# Patient Record
Sex: FEMALE | Race: WHITE | URBAN - METROPOLITAN AREA
[De-identification: names, ages, dates, MRNs, and addresses within clinical notes are randomized per-mention and may not be internally consistent; named-entity substitution may affect disease eponyms.]

---

## 2022-12-31 ENCOUNTER — ANESTHESIA (OUTPATIENT)
Dept: LABOR AND DELIVERY | Age: 32
End: 2022-12-31

## 2022-12-31 ENCOUNTER — ANESTHESIA EVENT (OUTPATIENT)
Dept: LABOR AND DELIVERY | Age: 32
End: 2022-12-31

## 2022-12-31 PROCEDURE — 6360000002 HC RX W HCPCS: Performed by: ANESTHESIOLOGY

## 2022-12-31 PROCEDURE — 3700000025 EPIDURAL BLOCK: Performed by: ANESTHESIOLOGY

## 2022-12-31 PROCEDURE — 6360000002 HC RX W HCPCS: Performed by: NURSE ANESTHETIST, CERTIFIED REGISTERED

## 2022-12-31 RX ORDER — ROPIVACAINE HYDROCHLORIDE 5 MG/ML
INJECTION, SOLUTION EPIDURAL; INFILTRATION; PERINEURAL PRN
Status: DISCONTINUED | OUTPATIENT
Start: 2022-12-31 | End: 2023-01-01 | Stop reason: SDUPTHER

## 2022-12-31 RX ORDER — ROPIVACAINE HYDROCHLORIDE 2 MG/ML
INJECTION, SOLUTION EPIDURAL; INFILTRATION; PERINEURAL CONTINUOUS PRN
Status: DISCONTINUED | OUTPATIENT
Start: 2022-12-31 | End: 2023-01-01 | Stop reason: SDUPTHER

## 2022-12-31 RX ADMIN — ROPIVACAINE HYDROCHLORIDE 10 ML/HR: 2 INJECTION, SOLUTION EPIDURAL; INFILTRATION; PERINEURAL at 18:19

## 2022-12-31 RX ADMIN — ROPIVACAINE HYDROCHLORIDE 12 ML: 5 INJECTION, SOLUTION EPIDURAL; INFILTRATION; PERINEURAL at 18:11

## 2022-12-31 NOTE — ANESTHESIA PROCEDURE NOTES
Epidural Block    Patient location during procedure: OB  Start time: 12/31/2022 6:09 PM  End time: 12/31/2022 6:12 PM  Reason for block: labor epidural  Staffing  Performed: anesthesiologist   Anesthesiologist: Kali Bennett MD  Epidural  Patient position: sitting  Prep: ChloraPrep  Patient monitoring: continuous pulse ox and frequent blood pressure checks  Approach: midline  Location: L3-4  Injection technique: HANNAH saline  Provider prep: mask and sterile gloves  Needle  Needle type: Tuohy   Needle gauge: 17 G  Needle length: 3.5 in  Needle insertion depth: 4.5 cm  Catheter type: end hole  Catheter size: 19 G  Catheter at skin depth: 10 cm  Test dose: negativeCatheter Secured: tegaderm  Assessment  Hemodynamics: stable  Attempts: 1  Outcomes: uncomplicated  Preanesthetic Checklist  Completed: patient identified, IV checked, site marked, risks and benefits discussed, surgical/procedural consents, equipment checked, timeout performed, anesthesia consent given, oxygen available and monitors applied/VS acknowledged

## 2022-12-31 NOTE — ANESTHESIA PRE PROCEDURE
Department of Anesthesiology  Preprocedure Note       Name:  Nia Olsen   Age:  29 y.o.  :  1990                                          MRN:  [id number]         Date:  2022      Surgeon: * No surgeons listed *    Procedure: * No procedures listed *    Medications prior to admission:   Prior to Admission medications    Not on File       Current medications:    Current Facility-Administered Medications   Medication Dose Route Frequency Provider Last Rate Last Admin    lactated ringers infusion   IntraVENous Continuous Michelle Russell MD        lactated ringers bolus  500 mL IntraVENous PRN Michelle Russell MD        Or    lactated ringers bolus  1,000 mL IntraVENous PRN Michelle Russell MD        sodium chloride flush 0.9 % injection 5-40 mL  5-40 mL IntraVENous 2 times per day Michelle Russell MD        sodium chloride flush 0.9 % injection 5-40 mL  5-40 mL IntraVENous PRN Michelle Russell MD        0.9 % sodium chloride infusion  25 mL IntraVENous PRN Michelle Russell MD        [facility]ron [facility]) injection 4 mg  4 mg IntraVENous Q6H PRN Michelle Russell MD        methylergonovine (METHERGINE) injection 200 mcg  200 mcg IntraMUSCular Once PRN Michelle Russell MD        carboprost (HEMABATE) injection 250 mcg  250 mcg IntraMUSCular Once PRN Michelle Russell MD        miSOPROStol (CYTOTEC) tablet 800 mcg  800 mcg Rectal Once PRN Michelle Russell MD        tranexamic acid-NaCl IVPB premix 1,000 mg  1,000 mg IntraVENous Once PRN Michelle Russell MD        acetaminophen (TYLENOL) tablet 650 mg  650 mg Oral Q4H PRN Michelle Russell MD        benzocaine-menthol (DERMOPLAST) 20-0.5 % spray   Topical PRN Michelle Russell MD        butorphanol (STADOL) injection 1 mg  1 mg IntraVENous Q3H PRN Michelle Russell MD        witch hazel-glycerin (TUCKS) pad   Topical PRN Michelle Russell MD        pramox-PE-glycerin-petrolatum cream   Rectal Q2H PRN Michelle Russell MD        hydrocortisone 2.5 % cream   Topical Q2H PRN Michelle Russell MD         Facility-Administered Medications Ordered in Other Encounters   Medication Dose Route Frequency Provider Last Rate Last Admin    ropivacaine (NAROPIN) 0.2% injection 0.2%   Epidural Continuous PRN NATALYA Cox CRNA 10 mL/hr at 12/31/22 1819 10 mL/hr at 12/31/22 1819       Allergies:  No Known Allergies    Problem List:    Patient Active Problem List   Diagnosis Code    Abdominal pain during pregnancy, third trimester O26.893, R10.9       Past Medical History:  History reviewed. No pertinent past medical history. Past Surgical History:  No past surgical history on file. Social History:    Social History     Tobacco Use    Smoking status: Not on file    Smokeless tobacco: Not on file   Substance Use Topics    Alcohol use: Not on file                                Counseling given: Not Answered      Vital Signs (Current):   Vitals:    12/31/22 1804 12/31/22 1816 12/31/22 1817 12/31/22 1820   BP: (!) 145/79 (!) 148/84 (!) 150/84 134/81   Pulse: (!) 112 (!) 115 (!) 107 87                                              BP Readings from Last 3 Encounters:   12/31/22 134/81       NPO Status:                                                                                 BMI:   Wt Readings from Last 3 Encounters:   No data found for Wt     There is no height or weight on file to calculate BMI.    CBC:   Lab Results   Component Value Date/Time    WBC 27.5 12/31/2022 05:40 PM    RBC 3.89 12/31/2022 05:40 PM    HGB 12.1 12/31/2022 05:40 PM    HCT 34.8 12/31/2022 05:40 PM    MCV 89.5 12/31/2022 05:40 PM    RDW 13.4 12/31/2022 05:40 PM     12/31/2022 05:40 PM       CMP: No results found for: NA, K, CL, CO2, BUN, CREATININE, GFRAA, AGRATIO, LABGLOM, GLUCOSE, GLU, PROT, CALCIUM, BILITOT, ALKPHOS, AST, ALT    POC Tests: No results for input(s): POCGLU, POCNA, POCK, POCCL, POCBUN, POCHEMO, POCHCT in the last 72 hours. Coags: No results found for: PROTIME, INR, APTT    HCG (If Applicable):  No results found for: PREGTESTUR, PREGSERUM, HCG, HCGQUANT     ABGs: No results found for: PHART, PO2ART, OUB8NMW, HTF3PCX, BEART, J0OWRVCY     Type & Screen (If Applicable):  No results found for: LABABO, LABRH    Drug/Infectious Status (If Applicable):  No results found for: HIV, HEPCAB    COVID-19 Screening (If Applicable): No results found for: COVID19        Anesthesia Evaluation  Patient summary reviewed and Nursing notes reviewed  Airway: Mallampati: II  TM distance: >3 FB   Neck ROM: full  Mouth opening: > = 3 FB   Dental: normal exam         Pulmonary:Negative Pulmonary ROS breath sounds clear to auscultation                             Cardiovascular:  Exercise tolerance: good (>4 METS),           Rhythm: regular  Rate: normal                    Neuro/Psych:   Negative Neuro/Psych ROS              GI/Hepatic/Renal: Neg GI/Hepatic/Renal ROS            Endo/Other: Negative Endo/Other ROS                    Abdominal:             Vascular: negative vascular ROS. Other Findings:           Anesthesia Plan      epidural     ASA 2             Anesthetic plan and risks discussed with patient.                         Hayde Avila MD   12/31/2022

## 2023-01-01 NOTE — ANESTHESIA POSTPROCEDURE EVALUATION
Department of Anesthesiology  Postprocedure Note    Patient: Marlo Jerome  MRN: [id number]  YOB: 1990  Date of evaluation: 1/1/2023      Procedure Summary     Date: 12/31/22 Room / Location:     Anesthesia Start: 1809 Anesthesia Stop: 01/01/23 0142    Procedure: Labor Analgesia Diagnosis:     Scheduled Providers:  Responsible Provider: Francis Ortega MD    Anesthesia Type: epidural ASA Status: 2          Anesthesia Type: No value filed.     M[place]ison Phase I:      M[place]ison Phase II:        Anesthesia Post Evaluation    Patient location during evaluation: PACU  Patient participation: complete - patient participated  Level of consciousness: awake and alert  Airway patency: patent  Nausea & Vomiting: no nausea  Cardiovascular status: hemodynamically stable  Respiratory status: acceptable  Hydration status: euvolemic